# Patient Record
Sex: FEMALE | URBAN - METROPOLITAN AREA
[De-identification: names, ages, dates, MRNs, and addresses within clinical notes are randomized per-mention and may not be internally consistent; named-entity substitution may affect disease eponyms.]

---

## 2018-01-09 ENCOUNTER — INPATIENT (INPATIENT)
Facility: HOSPITAL | Age: 78
LOS: 1 days | Discharge: HOME | DRG: 103 | End: 2018-01-11
Attending: PSYCHIATRY & NEUROLOGY | Admitting: PSYCHIATRY & NEUROLOGY
Payer: MEDICARE

## 2018-01-09 VITALS
SYSTOLIC BLOOD PRESSURE: 154 MMHG | OXYGEN SATURATION: 100 % | DIASTOLIC BLOOD PRESSURE: 64 MMHG | HEART RATE: 78 BPM | RESPIRATION RATE: 28 BRPM

## 2018-01-09 LAB
ALBUMIN SERPL ELPH-MCNC: 4.4 G/DL — SIGNIFICANT CHANGE UP (ref 3.3–5)
ALP SERPL-CCNC: 67 U/L — SIGNIFICANT CHANGE UP (ref 40–120)
ALT FLD-CCNC: 19 U/L — SIGNIFICANT CHANGE UP (ref 10–45)
ANION GAP SERPL CALC-SCNC: 17 MMOL/L — SIGNIFICANT CHANGE UP (ref 5–17)
APPEARANCE UR: CLEAR — SIGNIFICANT CHANGE UP
APTT BLD: 26.7 SEC — LOW (ref 27.5–37.4)
AST SERPL-CCNC: 38 U/L — SIGNIFICANT CHANGE UP (ref 10–40)
BASOPHILS NFR BLD AUTO: 0.5 % — SIGNIFICANT CHANGE UP (ref 0–2)
BILIRUB SERPL-MCNC: 0.5 MG/DL — SIGNIFICANT CHANGE UP (ref 0.2–1.2)
BILIRUB UR-MCNC: NEGATIVE — SIGNIFICANT CHANGE UP
BUN SERPL-MCNC: 14 MG/DL — SIGNIFICANT CHANGE UP (ref 7–23)
CALCIUM SERPL-MCNC: 9.7 MG/DL — SIGNIFICANT CHANGE UP (ref 8.4–10.5)
CHLORIDE SERPL-SCNC: 87 MMOL/L — LOW (ref 96–108)
CHOLEST SERPL-MCNC: 178 MG/DL — SIGNIFICANT CHANGE UP (ref 10–199)
CO2 SERPL-SCNC: 23 MMOL/L — SIGNIFICANT CHANGE UP (ref 22–31)
COLOR SPEC: YELLOW — SIGNIFICANT CHANGE UP
CREAT SERPL-MCNC: 0.81 MG/DL — SIGNIFICANT CHANGE UP (ref 0.5–1.3)
DIFF PNL FLD: NEGATIVE — SIGNIFICANT CHANGE UP
EOSINOPHIL NFR BLD AUTO: 0.9 % — SIGNIFICANT CHANGE UP (ref 0–6)
GLUCOSE SERPL-MCNC: 113 MG/DL — HIGH (ref 70–99)
GLUCOSE UR QL: NEGATIVE — SIGNIFICANT CHANGE UP
HBA1C BLD-MCNC: 5.8 % — HIGH (ref 4–5.6)
HCT VFR BLD CALC: 36.7 % — SIGNIFICANT CHANGE UP (ref 34.5–45)
HDLC SERPL-MCNC: 105 MG/DL — SIGNIFICANT CHANGE UP (ref 40–125)
HGB BLD-MCNC: 13.1 G/DL — SIGNIFICANT CHANGE UP (ref 11.5–15.5)
INR BLD: 1.11 — SIGNIFICANT CHANGE UP (ref 0.88–1.16)
KETONES UR-MCNC: (no result) MG/DL
LACTATE SERPL-SCNC: 1.6 MMOL/L — SIGNIFICANT CHANGE UP (ref 0.5–2)
LEUKOCYTE ESTERASE UR-ACNC: NEGATIVE — SIGNIFICANT CHANGE UP
LIPID PNL WITH DIRECT LDL SERPL: 62 MG/DL — SIGNIFICANT CHANGE UP
LYMPHOCYTES # BLD AUTO: 16.3 % — SIGNIFICANT CHANGE UP (ref 13–44)
MCHC RBC-ENTMCNC: 33.3 PG — SIGNIFICANT CHANGE UP (ref 27–34)
MCHC RBC-ENTMCNC: 35.7 G/DL — SIGNIFICANT CHANGE UP (ref 32–36)
MCV RBC AUTO: 93.4 FL — SIGNIFICANT CHANGE UP (ref 80–100)
MONOCYTES NFR BLD AUTO: 14.1 % — HIGH (ref 2–14)
NEUTROPHILS NFR BLD AUTO: 68.2 % — SIGNIFICANT CHANGE UP (ref 43–77)
NITRITE UR-MCNC: NEGATIVE — SIGNIFICANT CHANGE UP
PH UR: 8.5 — HIGH (ref 5–8)
PLATELET # BLD AUTO: 276 K/UL — SIGNIFICANT CHANGE UP (ref 150–400)
POTASSIUM SERPL-MCNC: 3.9 MMOL/L — SIGNIFICANT CHANGE UP (ref 3.5–5.3)
POTASSIUM SERPL-SCNC: 3.9 MMOL/L — SIGNIFICANT CHANGE UP (ref 3.5–5.3)
PROT SERPL-MCNC: 7.8 G/DL — SIGNIFICANT CHANGE UP (ref 6–8.3)
PROT UR-MCNC: NEGATIVE MG/DL — SIGNIFICANT CHANGE UP
PROTHROM AB SERPL-ACNC: 12.3 SEC — SIGNIFICANT CHANGE UP (ref 9.8–12.7)
RBC # BLD: 3.93 M/UL — SIGNIFICANT CHANGE UP (ref 3.8–5.2)
RBC # FLD: 12.9 % — SIGNIFICANT CHANGE UP (ref 10.3–16.9)
SODIUM SERPL-SCNC: 127 MMOL/L — LOW (ref 135–145)
SP GR SPEC: 1.01 — SIGNIFICANT CHANGE UP (ref 1–1.03)
TOTAL CHOLESTEROL/HDL RATIO MEASUREMENT: 1.7 RATIO — LOW (ref 3.3–7.1)
TRIGL SERPL-MCNC: 53 MG/DL — SIGNIFICANT CHANGE UP (ref 10–149)
UROBILINOGEN FLD QL: 0.2 E.U./DL — SIGNIFICANT CHANGE UP
WBC # BLD: 5.5 K/UL — SIGNIFICANT CHANGE UP (ref 3.8–10.5)
WBC # FLD AUTO: 5.5 K/UL — SIGNIFICANT CHANGE UP (ref 3.8–10.5)

## 2018-01-09 PROCEDURE — 99223 1ST HOSP IP/OBS HIGH 75: CPT

## 2018-01-09 PROCEDURE — 99291 CRITICAL CARE FIRST HOUR: CPT | Mod: 25

## 2018-01-09 PROCEDURE — 70498 CT ANGIOGRAPHY NECK: CPT | Mod: 26

## 2018-01-09 PROCEDURE — 70496 CT ANGIOGRAPHY HEAD: CPT | Mod: 26

## 2018-01-09 PROCEDURE — 70450 CT HEAD/BRAIN W/O DYE: CPT | Mod: 26,59

## 2018-01-09 PROCEDURE — 93010 ELECTROCARDIOGRAM REPORT: CPT

## 2018-01-09 RX ORDER — SODIUM CHLORIDE 9 MG/ML
500 INJECTION INTRAMUSCULAR; INTRAVENOUS; SUBCUTANEOUS ONCE
Qty: 0 | Refills: 0 | Status: COMPLETED | OUTPATIENT
Start: 2018-01-09 | End: 2018-01-09

## 2018-01-09 RX ORDER — HEPARIN SODIUM 5000 [USP'U]/ML
5000 INJECTION INTRAVENOUS; SUBCUTANEOUS EVERY 8 HOURS
Qty: 0 | Refills: 0 | Status: DISCONTINUED | OUTPATIENT
Start: 2018-01-09 | End: 2018-01-11

## 2018-01-09 RX ORDER — ASPIRIN/CALCIUM CARB/MAGNESIUM 324 MG
325 TABLET ORAL ONCE
Qty: 0 | Refills: 0 | Status: DISCONTINUED | OUTPATIENT
Start: 2018-01-09 | End: 2018-01-09

## 2018-01-09 RX ORDER — ASPIRIN/CALCIUM CARB/MAGNESIUM 324 MG
81 TABLET ORAL DAILY
Qty: 0 | Refills: 0 | Status: DISCONTINUED | OUTPATIENT
Start: 2018-01-10 | End: 2018-01-11

## 2018-01-09 RX ORDER — CLOPIDOGREL BISULFATE 75 MG/1
300 TABLET, FILM COATED ORAL ONCE
Qty: 0 | Refills: 0 | Status: DISCONTINUED | OUTPATIENT
Start: 2018-01-09 | End: 2018-01-09

## 2018-01-09 RX ORDER — ONDANSETRON 8 MG/1
4 TABLET, FILM COATED ORAL ONCE
Qty: 0 | Refills: 0 | Status: COMPLETED | OUTPATIENT
Start: 2018-01-09 | End: 2018-01-09

## 2018-01-09 RX ORDER — ATORVASTATIN CALCIUM 80 MG/1
80 TABLET, FILM COATED ORAL ONCE
Qty: 0 | Refills: 0 | Status: COMPLETED | OUTPATIENT
Start: 2018-01-09 | End: 2018-01-09

## 2018-01-09 RX ORDER — SODIUM CHLORIDE 9 MG/ML
1000 INJECTION INTRAMUSCULAR; INTRAVENOUS; SUBCUTANEOUS
Qty: 0 | Refills: 0 | Status: DISCONTINUED | OUTPATIENT
Start: 2018-01-09 | End: 2018-01-11

## 2018-01-09 RX ORDER — CLOPIDOGREL BISULFATE 75 MG/1
150 TABLET, FILM COATED ORAL ONCE
Qty: 0 | Refills: 0 | Status: COMPLETED | OUTPATIENT
Start: 2018-01-09 | End: 2018-01-09

## 2018-01-09 RX ORDER — ATORVASTATIN CALCIUM 80 MG/1
80 TABLET, FILM COATED ORAL AT BEDTIME
Qty: 0 | Refills: 0 | Status: DISCONTINUED | OUTPATIENT
Start: 2018-01-09 | End: 2018-01-11

## 2018-01-09 RX ORDER — CLOPIDOGREL BISULFATE 75 MG/1
75 TABLET, FILM COATED ORAL DAILY
Qty: 0 | Refills: 0 | Status: DISCONTINUED | OUTPATIENT
Start: 2018-01-10 | End: 2018-01-11

## 2018-01-09 RX ORDER — ATORVASTATIN CALCIUM 80 MG/1
80 TABLET, FILM COATED ORAL AT BEDTIME
Qty: 0 | Refills: 0 | Status: DISCONTINUED | OUTPATIENT
Start: 2018-01-09 | End: 2018-01-09

## 2018-01-09 RX ORDER — ASPIRIN/CALCIUM CARB/MAGNESIUM 324 MG
325 TABLET ORAL ONCE
Qty: 0 | Refills: 0 | Status: COMPLETED | OUTPATIENT
Start: 2018-01-09 | End: 2018-01-09

## 2018-01-09 RX ADMIN — HEPARIN SODIUM 5000 UNIT(S): 5000 INJECTION INTRAVENOUS; SUBCUTANEOUS at 22:58

## 2018-01-09 RX ADMIN — SODIUM CHLORIDE 80 MILLILITER(S): 9 INJECTION INTRAMUSCULAR; INTRAVENOUS; SUBCUTANEOUS at 23:27

## 2018-01-09 RX ADMIN — SODIUM CHLORIDE 500 MILLILITER(S): 9 INJECTION INTRAMUSCULAR; INTRAVENOUS; SUBCUTANEOUS at 20:49

## 2018-01-09 RX ADMIN — Medication 325 MILLIGRAM(S): at 20:49

## 2018-01-09 RX ADMIN — ATORVASTATIN CALCIUM 80 MILLIGRAM(S): 80 TABLET, FILM COATED ORAL at 20:49

## 2018-01-09 RX ADMIN — SODIUM CHLORIDE 500 MILLILITER(S): 9 INJECTION INTRAMUSCULAR; INTRAVENOUS; SUBCUTANEOUS at 19:35

## 2018-01-09 RX ADMIN — CLOPIDOGREL BISULFATE 150 MILLIGRAM(S): 75 TABLET, FILM COATED ORAL at 20:49

## 2018-01-09 RX ADMIN — ONDANSETRON 4 MILLIGRAM(S): 8 TABLET, FILM COATED ORAL at 19:40

## 2018-01-09 NOTE — ED ADULT NURSE NOTE - CHPI ED SYMPTOMS NEG
no weakness/no vomiting/no confusion/no blurred vision/no dizziness/no fever/no loss of consciousness/no change in level of consciousness

## 2018-01-09 NOTE — H&P ADULT - PROBLEM SELECTOR PLAN 3
Patient with Hyponatremia to 127 on admission, serum Osm pending. Likely hypovolemic in setting of recent diarrhea, decreased PO intake and nausea.   - f/u Serum Osm, Urine Osm, Urine electrolytes  - Na of 127 reflects sodium deficit of 338mEq of Na  - c/w NS at 80cc/hr given hypovolemia  - f/u Q6H BMP to ensure not overcorrecting

## 2018-01-09 NOTE — ED PROVIDER NOTE - PHYSICAL EXAMINATION
CON: ao x 3, HENMT: clear oropharynx, soft neck, HEAD: atraumatic, CV: rrr, equal pulses b/l, RESP: cta b/l, GI: +BS, soft, nontender, no rebound, no guarding, SKIN: no rash, MSK: no deformities, NEURO: LUE and LLE strength 4/5, RUE and RLE 5/5, mild L facial droop, able to name all objects, ao x 3, dec sensation to LLE, b/l dysmetria

## 2018-01-09 NOTE — CONSULT NOTE ADULT - ASSESSMENT
78 y/o F PMHx migraine headaches for many years, arrived with complaints of migraine headache, left sided numbness and weakness. Also with nausea, dizziness, gait instability. NIHSS 6. Outside of the window for TPA. CT head without signs of acute infarct or hemorrhage. CTA head/neck unremarkable.    - Admit to [facility] for stroke workup  - Aspirin 81mg daily  - Atorvastatin 80mg daily  - MRI head  - Echocardiogram with bubble and doppler  - PT/OT  - Permissive HTN to 200/110  - Bedside dysphagia  - Speech consult  - HgbA1C, lipid panel, TSH 76 y/o F PMHx migraine headaches for many years, arrived with complaints of migraine headache, left sided numbness and weakness. Also with nausea, dizziness, gait instability. NIHSS 6. Outside of the window for TPA. CT head without signs of acute infarct or hemorrhage. CTA head/neck unremarkable. Patient with acute stroke with left hemisensory and hemiparesis and slurred speech that is out of the window for tpa and therefore will be loaded with 150 of plavix and 325 of asa.   No evidence of sepsis    - Admit to [facility] for stroke workup  - Aspirin 81mg daily  - Atorvastatin 80mg daily  - MRI head  - Echocardiogram with bubble and doppler  - PT/OT  - Permissive HTN to 200/110  - Bedside dysphagia  - Speech consult  - HgbA1C, lipid panel, TSH

## 2018-01-09 NOTE — ED PROVIDER NOTE - PROGRESS NOTE DETAILS
poor historian, initially per EMS onset of sx 2 hr ago, stroke called for launch pad, upon further interview w/ stroke team, appears paresthesia began at noon, d/w stroke team, no indication for tPA, also check signs of infection.  pt rectally afebrile, no leukocytosis, more likely neuro.  TIA/CVA vs complex migraine?  will admit to neuro for further eval

## 2018-01-09 NOTE — H&P ADULT - PROBLEM SELECTOR PLAN 4
Patient states she has a history of asthma, thinks she is on Montelukast and another inhaler, unable to remember names of medications  - Duoneb PRN  - f/u collateral in AM

## 2018-01-09 NOTE — H&P ADULT - ATTENDING COMMENTS
Patient seen and examined with son and daughter at bedside.  Patient came to Emergency Room when she thought that she saw left facial droop when she looked into the mirror.  No specific weakness on left arm or leg.  She had some numbness of her left hand to shoulder but she sometimes has that during migraines.  She had numbness around the right side of her lips and her mouth felt asleep.  This feeling didn't last long.  Some slurred speech.  No word finding difficulty.  Some trouble seeing the periphery of her vision of both eyes which is new.  She's also had double vision for the past few days with images [id number] but slightly askew.  She also felt shaky but that happens when she feels anxious.    She's most concerned about her headache that was located at her cheek bones and the right side of her head and the back of her neck.  It was very severe, rating 9/10.  She was treating it by drinking extra water.  She doesn't like to take medications.  She's tried Verapamil and something else prescribed by her Neurologist but it didn't help the headaches.  She rates her headache 3/10 now.    Her neurological exam is generally negative.  Her lip slants to the left but she has this on previous pictures on her Instagram account so doubt new.  Her symptoms sound most compatible with complicated migraine.  Will await MRI Brain to rule out infarct since it's a diagnosis of exclusion.

## 2018-01-09 NOTE — H&P ADULT - NSHPPHYSICALEXAM_GEN_ALL_CORE
.  VITAL SIGNS:  T(C): 36.8 (01-09-18 @ 23:55), Max: 37 (01-09-18 @ 19:41)  T(F): 98.3 (01-09-18 @ 23:55), Max: 98.6 (01-09-18 @ 19:41)  HR: 67 (01-09-18 @ 23:55) (58 - 78)  BP: 144/65 (01-09-18 @ 23:55) (144/65 - 156/82)  BP(mean): --  RR: 18 (01-09-18 @ 23:55) (18 - 28)  SpO2: 100% (01-09-18 @ 23:55) (98% - 100%)  Wt(kg): --    PHYSICAL EXAM:    Constitutional: Elderly female resting in bed, mild distress appears 2/2 anxiety  Head: NC/AT  Eyes: Ptosis that corrects with effort, PERRL, EOMI, anicteric sclera  ENT: no nasal discharge; uvula midline, no oropharyngeal erythema or exudates; MMM  Neck: supple; no JVD or thyromegaly  Respiratory: CTA B/L; no W/R/R, no retractions  Cardiac: +S1/S2; RRR; no M/R/G; PMI non-displaced  Gastrointestinal: soft, NT/ND; no rebound or guarding; +BSx4  Back: spine midline, no bony tenderness or step-offs; no CVAT B/L  Extremities: WWP, no clubbing or cyanosis; no peripheral edema  Musculoskeletal: NROM x4; no joint swelling, tenderness or erythema  Vascular: 2+ radial, femoral, DP/PT pulses B/L  Dermatologic: skin warm, dry and intact; no rashes, wounds, or scars  Lymphatic: no submandibular or cervical LAD  Neurologic: AAOx3; mild L sided facial droop, tongue midline, shrug intact, 4/5 LUE and LLE, 5/5 RUE and RLE, decreased sensation to light touch and pinprick of left arm, dysmetria b/l on finger-nose testing  Psychiatric: Patient appears extremely anxious with shaking episodes and fears of nausea

## 2018-01-09 NOTE — H&P ADULT - PROBLEM SELECTOR PLAN 2
Patient with long history of severe migraine headaches with aura, usually visual scotoma with "burning" feeling of the brain. Patient with these prior to episode, though states all symptoms from aura to headache and associated L sided weakness/numbness are worse than usual  - Continue to monitor symptoms  - Tylenol PRN  - Patient unsure of home medications, f/u collateral in AM

## 2018-01-09 NOTE — ED ADULT NURSE REASSESSMENT NOTE - NS ED NURSE REASSESS COMMENT FT1
unable to collect blood cultures. pt afebrile. dr fisher aware. as per dr fisher ok to hold off on blood cultures at this time.

## 2018-01-09 NOTE — H&P ADULT - HISTORY OF PRESENT ILLNESS
Patient is a 78yo F poor historian with a PMHx of migraine headaches for many years, asthma, who arrived with complaints of migraine headache, left sided numbness and weakness. Patient states she usually gets severe migraine headaches, but not quite this severe and with less pronounced symptoms of weakness or numbness. She said the headache started at work () at 12pm and right after she noticed left arm numbness and weakness. She began to feel nauseated and dizzy. She went home to lay down. She had 6 episodes of diarrhea, initially soft then progressed to watery. Daughter went to patient's house to see her and she noticed the patient's speech was slower and slurred. She also noticed that the patient was unsteady on her feet while walking to the bathroom. Patient's  is a physician and advised them to come to the hospital. Patient arrived to the ED as a stroke code at 4:48pm. Per family, patient's blood pressures are usually low. Patient denies any other recent changes to her health, denies weight gain or weight loss, fevers, chills, cough, SOB, CP, palpitations, abdominal pain, dysuria, lower extremity edema, decreased exercise tolerance.    In the ED, VS T 98.6, HR 78, /64, R 20, SpO2 98% on room air. Labs without leukocytosis or anemia, no coagulopathy, hyponatremia to 127, hypochloremia to 87, Patient is a 76yo F poor historian with a PMHx of migraine headaches for many years, asthma, who arrived with complaints of migraine headache, left sided numbness and weakness. Patient states she usually gets severe migraine headaches, but not quite this severe and with less pronounced symptoms of weakness or numbness. She said the headache started at work () at 12pm and right after she noticed left arm numbness and weakness. She began to feel nauseated and dizzy. She went home to lay down. She had 6 episodes of diarrhea, initially soft then progressed to watery. Daughter went to patient's house to see her and she noticed the patient's speech was slower and slurred. She also noticed that the patient was unsteady on her feet while walking to the bathroom. Patient's  is a physician and advised them to come to the hospital. Patient arrived to the ED as a stroke code at 4:48pm. Per family, patient's blood pressures are usually low. Patient denies any other recent changes to her health, denies weight gain or weight loss, fevers, chills, cough, SOB, CP, palpitations, abdominal pain, dysuria, lower extremity edema, decreased exercise tolerance.    In the ED, VS T 98.6, HR 78, /64, R 20, SpO2 98% on room air. Labs without leukocytosis or anemia, no coagulopathy, hyponatremia to 127, hypochloremia to 87, negative UA, CT without evidence of acute hemorrhage or infarct, CTA without significant stenosis. Patient given Aspirin 325mg, Atorvastatin 80mg, Plavix 150mg, Zofran 4mg, and a 1L NS bolus. Patient admitted to [facility] under Neurology for telemetry monitoring. Patient is a 76yo F poor historian with a PMHx of migraine headaches for many years, asthma, who arrived with complaints of migraine headache, left sided numbness and weakness. Patient states she usually gets severe migraine headaches, but not quite this severe and with less pronounced symptoms of weakness or numbness. She said the headache started at work () at 12pm and right after she noticed left arm numbness and weakness. She began to feel nauseated and dizzy. She went home to lay down. She had 6 episodes of diarrhea, initially soft then progressed to watery. Daughter went to patient's house to see her and she noticed the patient's speech was slower and slurred. She also noticed that the patient was unsteady on her feet while walking to the bathroom. Patient's  is a physician and advised them to come to the hospital. Patient arrived to the ED as a stroke code at 6:48pm. Per family, patient's blood pressures are usually low. Patient denies any other recent changes to her health, denies weight gain or weight loss, fevers, chills, cough, SOB, CP, palpitations, abdominal pain, dysuria, lower extremity edema, decreased exercise tolerance.    In the ED, VS T 98.6, HR 78, /64, R 20, SpO2 98% on room air. Labs without leukocytosis or anemia, no coagulopathy, hyponatremia to 127, hypochloremia to 87, negative UA, CT without evidence of acute hemorrhage or infarct, CTA without significant stenosis. Patient given Aspirin 325mg, Atorvastatin 80mg, Plavix 150mg, Zofran 4mg, and a 1L NS bolus. Patient admitted to [facility] under Neurology for telemetry monitoring.

## 2018-01-09 NOTE — H&P ADULT - PROBLEM SELECTOR PLAN 1
Patient presenting after roughly 7 hours of L sided numbness and weakness, presenting as a stroke code at 6:48pm with CT negative for acute infarct or hemorrhage and CTA without any significant stenosis. Patient outside the window for tPA administration. NIHSS of 6 on admission.  - Discuss MRI with Dr. Gee in AM  - c/w Aspirin 81mg, Plavix 75mg, Atorvastatin 80mg  - PT/OT consult  - Bedside dysphagia screening  - f/u A1c, Lipid panel, TSH  - EKG, Echo  - telemetry monitoring on [facility]  - Permissive HTN to 220/110 Patient presenting after roughly 7 hours of L sided numbness and weakness, presenting as a stroke code at 6:48pm with CT negative for acute infarct or hemorrhage and CTA without any significant stenosis. Patient outside the window for tPA administration. NIHSS of 6 on admission.  - needs MRI brain  - c/w Aspirin 81mg, Plavix 75mg, Atorvastatin 80mg  - PT/OT consult  - Bedside dysphagia screening  - f/u A1c, Lipid panel, TSH  - EKG, Echo  - telemetry monitoring on [facility]  - Permissive HTN to 220/110

## 2018-01-09 NOTE — H&P ADULT - ASSESSMENT
76yo F poor historian with a PMHx of migraine headaches for many years, asthma, who arrived with complaints of migraine headache, left sided numbness and weakness at noon on the day of admission, presenting as a stroke code at 6:48pm outside of the window for tPA, symptoms concerning for infarct vs TIA vs complex migraine

## 2018-01-09 NOTE — ED PROVIDER NOTE - OBJECTIVE STATEMENT
77 yof pw left arm numbness, unsteadiness, and migraine.  pt states sx began at noon (pt poor historian, initially reported sx onset 2hr PTA in ED, but then also reported noon).  also had diarrhea afterwards.  daughter reports slur of speech/slow to speech as well.  no HA, no cp.  code stroke called.

## 2018-01-09 NOTE — CONSULT NOTE ADULT - SUBJECTIVE AND OBJECTIVE BOX
76 y/o F PMHx migraine headaches for many years, arrived with complaints of migraine headache, left sided numbness and weakness. Patient states she usually gets severe migraine headaches, but not usually associated with these symptoms. She said the headache started at work () at 12pm and right after she noticed left arm numbness and weakness. She began to feel nauseated and dizzy. She went home to lay down. She had 6 episodes of diarrhea, initially soft then progressed to watery. Daughter NEUROLOGY INITIAL CONSULT NOTE    CHIEF COMPLAINT:  slurred speech    HPI: 76 y/o F PMHx migraine headaches for many years, arrived with complaints of migraine headache, left sided numbness and weakness. Patient states she usually gets severe migraine headaches, but not usually associated with these symptoms. She said the headache started at work () at 12pm and right after she noticed left arm numbness and weakness. She began to feel nauseated and dizzy. She went home to lay down. She had 6 episodes of diarrhea, initially soft then progressed to watery. Daughter went to patient's house to see her and she noticed the patient's speech was slower and slurred. She also noticed that the patient was unsteady on her feet while walking to the bathroom. Patient's  is a physician and advised them to come to the hospital. Patient arrived to the ED as a stroke code at 4:48pm. Per family, patient's blood pressures are usually low.    On arrival /64      PAST MEDICAL & SURGICAL HISTORY:  Migraines      REVIEW OF SYSTEMS:  As per HPI, otherwise negative for Constitutional, Eyes, Ears/Nose/Mouth/Throat, Neck, Cardiovascular, Respiratory, Gastrointestinal, Genitourinary, Skin, Endocrine, Musculoskeletal, Psychiatric, and Hematologic/Lymphatic.    MEDICATIONS  (STANDING):  aspirin 325 milliGRAM(s) Oral Once  atorvastatin 80 milliGRAM(s) Oral once  clopidogrel Tablet 150 milliGRAM(s) Oral once  sodium chloride 0.9% Bolus 500 milliLiter(s) IV Bolus once    MEDICATIONS  (PRN):      Allergies    epinephrine (Other)    Intolerances        FAMILY HISTORY:      SOCIAL HISTORY:  Living Situation: lives with   Occupation:   Tobacco: unknown  Alcohol: unknown  Drug use:  unknown    VITAL SIGNS:  Vital Signs Last 24 Hrs  T(C): 37 (2018 19:41), Max: 37 (2018 19:41)  T(F): 98.6 (2018 19:41), Max: 98.6 (2018 19:41)  HR: 58 (2018 19:41) (58 - 78)  BP: 153/63 (2018 19:41) (144/71 - 154/64)  BP(mean): --  RR: 20 (2018 19:41) (20 - 28)  SpO2: 98% (2018 19:41) (98% - 100%)    PHYSICAL EXAMINATION:  General: Well-developed, well nourished, anxious, nauseated.  Eyes: Conjunctiva and sclera clear.  Cardiovascular: Regular rate and rhythm; S1 and S2 Normal; No murmurs, gallops or rubs.  Neurologic:  - Mental Status:  Alert, awake, oriented to person, place, and time; Speech is mildly slurred with intact naming, repetition, and comprehension; Good overall fund of knowledge.  - Cranial Nerves II-XII:    II:  Visual acuity is 20/20 bilaterally; Visual fields are full to confrontation; Pupils are equal, round, and reactive to light.  III, IV, VI:  Extraocular movements are intact without nystagmus.  V:  Facial sensation is intact in the V1-V3 distribution bilaterally.  VII:  Face is symmetric with normal eye closure and smile; + mild left facial droop  VIII:  Hearing is intact to finger rub.  IX, X:  Uvula is midline and soft palate rises symmetrically  XI:  Head turning and shoulder shrug are intact.  XII:  Tongue protrudes in the midline.  - Motor:  Strength is 4/5 LUE and LLE, 5/5 RUE and RLE.  There is no pronator drift.  Normal muscle bulk and tone throughout.  - Reflexes:  2+ and symmetric at the biceps, triceps, brachioradialis, knees, and ankles.  Plantar responses flexor.  - Sensory:  Decreased to light touch and pinprick of left arm.  - Coordination:  Finger-nose-finger with dysmetria b/l.  Rapid alternating hand movements intact.  - Gait:   Deferred    LABS:                        13.1   5.5   )-----------( 276      ( 2018 19:47 )             36.7         127<L>  |  87<L>  |  14  ----------------------------<  113<H>  3.9   |  23  |  0.81    Ca    9.7      2018 19:47    TPro  7.8  /  Alb  4.4  /  TBili  0.5  /  DBili  x   /  AST  38  /  ALT  19  /  AlkPhos  67  -    PT/INR - ( 2018 19:47 )   PT: 12.3 sec;   INR: 1.11          PTT - ( 2018 19:47 )  PTT:26.7 sec  Urinalysis Basic - ( 2018 20:07 )    Color: Yellow / Appearance: Clear / S.010 / pH: x  Gluc: x / Ketone: Trace mg/dL  / Bili: Negative / Urobili: 0.2 E.U./dL   Blood: x / Protein: NEGATIVE mg/dL / Nitrite: NEGATIVE   Leuk Esterase: NEGATIVE / RBC: x / WBC x   Sq Epi: x / Non Sq Epi: x / Bacteria: x        RADIOLOGY & ADDITIONAL STUDIES:    < from: CT Brain Stroke Protocol (18 @ 19:27) >  IMPRESSION:    1.  No CT evidence of acute intracranial hemorrhage.  2. 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 If   there is concern for acute infarct, consideration could be given to brain   MR with diffusion-weighted imaging as this is a more sensitive study for   its detection.  3. Age-appropriate volume loss.    < end of copied text >    < from: CT Angio Head w/ IV Cont (18 @ 19:27) >  IMPRESSION:     CTA BRAIN:    No significant stenosis of the intracranial [place].    CTA NECK:     1. No significant carotid bifurcation stenosis or vertebral artery   stenosis.  2. Left vertebral artery origin arises directly from the aortic arch,   normal variant.    < end of copied text >

## 2018-01-09 NOTE — H&P ADULT - NSHPLABSRESULTS_GEN_ALL_CORE
.  LABS:                         13.1   5.5   )-----------( 276      ( 2018 19:47 )             36.7         127<L>  |  87<L>  |  14  ----------------------------<  113<H>  3.9   |  23  |  0.81    Ca    9.7      2018 19:47    TPro  7.8  /  Alb  4.4  /  TBili  0.5  /  DBili  x   /  AST  38  /  ALT  19  /  AlkPhos  67      PT/INR - ( 2018 19:47 )   PT: 12.3 sec;   INR: 1.11          PTT - ( 2018 19:47 )  PTT:26.7 sec  Urinalysis Basic - ( 2018 20:07 )    Color: Yellow / Appearance: Clear / S.010 / pH: x  Gluc: x / Ketone: Trace mg/dL  / Bili: Negative / Urobili: 0.2 E.U./dL   Blood: x / Protein: NEGATIVE mg/dL / Nitrite: NEGATIVE   Leuk Esterase: NEGATIVE / RBC: x / WBC x   Sq Epi: x / Non Sq Epi: x / Bacteria: x            Lactate, Blood: 1.6 mmoL/L ( @ 20:23)      RADIOLOGY, EKG & ADDITIONAL TESTS: Reviewed. .  LABS:                         13.1   5.5   )-----------( 276      ( 2018 19:47 )             36.7         127<L>  |  87<L>  |  14  ----------------------------<  113<H>  3.9   |  23  |  0.81    Ca    9.7      2018 19:47    TPro  7.8  /  Alb  4.4  /  TBili  0.5  /  DBili  x   /  AST  38  /  ALT  19  /  AlkPhos  67      PT/INR - ( 2018 19:47 )   PT: 12.3 sec;   INR: 1.11          PTT - ( 2018 19:47 )  PTT:26.7 sec  Urinalysis Basic - ( 2018 20:07 )    Color: Yellow / Appearance: Clear / S.010 / pH: x  Gluc: x / Ketone: Trace mg/dL  / Bili: Negative / Urobili: 0.2 E.U./dL   Blood: x / Protein: NEGATIVE mg/dL / Nitrite: NEGATIVE   Leuk Esterase: NEGATIVE / RBC: x / WBC x   Sq Epi: x / Non Sq Epi: x / Bacteria: x      Lactate, Blood: 1.6 mmoL/L ( @ 20:23)      RADIOLOGY, EKG & ADDITIONAL TESTS: Reviewed.

## 2018-01-10 DIAGNOSIS — R63.8 OTHER SYMPTOMS AND SIGNS CONCERNING FOOD AND FLUID INTAKE: ICD-10-CM

## 2018-01-10 DIAGNOSIS — Z02.9 ENCOUNTER FOR ADMINISTRATIVE EXAMINATIONS, UNSPECIFIED: ICD-10-CM

## 2018-01-10 DIAGNOSIS — R20.2 PARESTHESIA OF SKIN: ICD-10-CM

## 2018-01-10 DIAGNOSIS — J45.909 UNSPECIFIED ASTHMA, UNCOMPLICATED: ICD-10-CM

## 2018-01-10 DIAGNOSIS — Z29.9 ENCOUNTER FOR PROPHYLACTIC MEASURES, UNSPECIFIED: ICD-10-CM

## 2018-01-10 DIAGNOSIS — E87.1 HYPO-OSMOLALITY AND HYPONATREMIA: ICD-10-CM

## 2018-01-10 DIAGNOSIS — G43.909 MIGRAINE, UNSPECIFIED, NOT INTRACTABLE, WITHOUT STATUS MIGRAINOSUS: ICD-10-CM

## 2018-01-10 LAB
ANION GAP SERPL CALC-SCNC: 14 MMOL/L — SIGNIFICANT CHANGE UP (ref 5–17)
BUN SERPL-MCNC: 11 MG/DL — SIGNIFICANT CHANGE UP (ref 7–23)
CALCIUM SERPL-MCNC: 8.4 MG/DL — SIGNIFICANT CHANGE UP (ref 8.4–10.5)
CHLORIDE SERPL-SCNC: 92 MMOL/L — LOW (ref 96–108)
CHOLEST SERPL-MCNC: 152 MG/DL — SIGNIFICANT CHANGE UP (ref 10–199)
CO2 SERPL-SCNC: 20 MMOL/L — LOW (ref 22–31)
CREAT SERPL-MCNC: 0.74 MG/DL — SIGNIFICANT CHANGE UP (ref 0.5–1.3)
GLUCOSE SERPL-MCNC: 130 MG/DL — HIGH (ref 70–99)
HBA1C BLD-MCNC: 5.5 % — SIGNIFICANT CHANGE UP (ref 4–5.6)
HCOV NL63 RNA SPEC QL NAA+PROBE: DETECTED
HDLC SERPL-MCNC: 94 MG/DL — SIGNIFICANT CHANGE UP (ref 40–125)
LIPID PNL WITH DIRECT LDL SERPL: 50 MG/DL — SIGNIFICANT CHANGE UP
OSMOLALITY SERPL: 273 MOSM/KG — LOW (ref 280–301)
POTASSIUM SERPL-MCNC: 3.8 MMOL/L — SIGNIFICANT CHANGE UP (ref 3.5–5.3)
POTASSIUM SERPL-SCNC: 3.8 MMOL/L — SIGNIFICANT CHANGE UP (ref 3.5–5.3)
RAPID RVP RESULT: DETECTED
SODIUM SERPL-SCNC: 126 MMOL/L — LOW (ref 135–145)
TOTAL CHOLESTEROL/HDL RATIO MEASUREMENT: 1.6 RATIO — LOW (ref 3.3–7.1)
TRIGL SERPL-MCNC: 39 MG/DL — SIGNIFICANT CHANGE UP (ref 10–149)
TSH SERPL-MCNC: 1.02 UIU/ML — SIGNIFICANT CHANGE UP (ref 0.35–4.94)

## 2018-01-10 PROCEDURE — 71045 X-RAY EXAM CHEST 1 VIEW: CPT | Mod: 26

## 2018-01-10 PROCEDURE — 93306 TTE W/DOPPLER COMPLETE: CPT | Mod: 26

## 2018-01-10 PROCEDURE — 70551 MRI BRAIN STEM W/O DYE: CPT | Mod: 26

## 2018-01-10 PROCEDURE — 99233 SBSQ HOSP IP/OBS HIGH 50: CPT

## 2018-01-10 RX ORDER — ACETAMINOPHEN 500 MG
650 TABLET ORAL EVERY 6 HOURS
Qty: 0 | Refills: 0 | Status: DISCONTINUED | OUTPATIENT
Start: 2018-01-10 | End: 2018-01-11

## 2018-01-10 RX ADMIN — Medication 650 MILLIGRAM(S): at 21:41

## 2018-01-10 RX ADMIN — Medication 650 MILLIGRAM(S): at 16:01

## 2018-01-10 RX ADMIN — ATORVASTATIN CALCIUM 80 MILLIGRAM(S): 80 TABLET, FILM COATED ORAL at 21:41

## 2018-01-10 RX ADMIN — Medication 81 MILLIGRAM(S): at 13:46

## 2018-01-10 RX ADMIN — HEPARIN SODIUM 5000 UNIT(S): 5000 INJECTION INTRAVENOUS; SUBCUTANEOUS at 21:43

## 2018-01-10 RX ADMIN — HEPARIN SODIUM 5000 UNIT(S): 5000 INJECTION INTRAVENOUS; SUBCUTANEOUS at 14:06

## 2018-01-10 RX ADMIN — SODIUM CHLORIDE 80 MILLILITER(S): 9 INJECTION INTRAMUSCULAR; INTRAVENOUS; SUBCUTANEOUS at 16:02

## 2018-01-10 RX ADMIN — CLOPIDOGREL BISULFATE 75 MILLIGRAM(S): 75 TABLET, FILM COATED ORAL at 13:45

## 2018-01-10 RX ADMIN — HEPARIN SODIUM 5000 UNIT(S): 5000 INJECTION INTRAVENOUS; SUBCUTANEOUS at 07:25

## 2018-01-10 NOTE — PHYSICAL THERAPY INITIAL EVALUATION ADULT - GENERAL OBSERVATIONS, REHAB EVAL
Patient received semi-supine in bed in no acute distress, heplock. Patient with complaints of headache, given Tylenol by RN.

## 2018-01-10 NOTE — PHYSICAL THERAPY INITIAL EVALUATION ADULT - PERTINENT HX OF CURRENT PROBLEM, REHAB EVAL
77 year old female arrived with complaints of migraine headache, left sided numbness and weakness. Patient with history of migraines but not as severe and with pronounced symptoms, patient now with slurred speech, unsteady gait and diarrhea.

## 2018-01-10 NOTE — PHYSICAL THERAPY INITIAL EVALUATION ADULT - ADDITIONAL COMMENTS
Patient lives in elevator apartment building with 5 steps in lobby, independent without devices. Patient is right handed, does not wear glasses. Patient denies numbness and tingling. As per patient states her speech is fine, as per family present states her speech sounds like it does when she is not feeling well, but it is not slurred currently.     CN II-XII intact, vision intact, smooth pursuit.

## 2018-01-10 NOTE — PHYSICAL THERAPY INITIAL EVALUATION ADULT - CRITERIA FOR SKILLED THERAPEUTIC INTERVENTIONS
impairments found/rehab potential/therapy frequency/anticipated discharge recommendation/functional limitations in following categories

## 2018-01-11 ENCOUNTER — TRANSCRIPTION ENCOUNTER (OUTPATIENT)
Age: 78
End: 2018-01-11

## 2018-01-11 VITALS
HEART RATE: 52 BPM | OXYGEN SATURATION: 98 % | DIASTOLIC BLOOD PRESSURE: 69 MMHG | SYSTOLIC BLOOD PRESSURE: 153 MMHG | RESPIRATION RATE: 17 BRPM

## 2018-01-11 DIAGNOSIS — R68.89 OTHER GENERAL SYMPTOMS AND SIGNS: ICD-10-CM

## 2018-01-11 LAB
ANION GAP SERPL CALC-SCNC: 12 MMOL/L — SIGNIFICANT CHANGE UP (ref 5–17)
BUN SERPL-MCNC: 11 MG/DL — SIGNIFICANT CHANGE UP (ref 7–23)
CALCIUM SERPL-MCNC: 8.7 MG/DL — SIGNIFICANT CHANGE UP (ref 8.4–10.5)
CHLORIDE SERPL-SCNC: 96 MMOL/L — SIGNIFICANT CHANGE UP (ref 96–108)
CO2 SERPL-SCNC: 22 MMOL/L — SIGNIFICANT CHANGE UP (ref 22–31)
CREAT SERPL-MCNC: 0.81 MG/DL — SIGNIFICANT CHANGE UP (ref 0.5–1.3)
GLUCOSE SERPL-MCNC: 96 MG/DL — SIGNIFICANT CHANGE UP (ref 70–99)
HCT VFR BLD CALC: 33.5 % — LOW (ref 34.5–45)
HGB BLD-MCNC: 11.7 G/DL — SIGNIFICANT CHANGE UP (ref 11.5–15.5)
MAGNESIUM SERPL-MCNC: 2 MG/DL — SIGNIFICANT CHANGE UP (ref 1.6–2.6)
MCHC RBC-ENTMCNC: 33.1 PG — SIGNIFICANT CHANGE UP (ref 27–34)
MCHC RBC-ENTMCNC: 34.9 G/DL — SIGNIFICANT CHANGE UP (ref 32–36)
MCV RBC AUTO: 94.9 FL — SIGNIFICANT CHANGE UP (ref 80–100)
PHOSPHATE SERPL-MCNC: 3.1 MG/DL — SIGNIFICANT CHANGE UP (ref 2.5–4.5)
PLATELET # BLD AUTO: 238 K/UL — SIGNIFICANT CHANGE UP (ref 150–400)
POTASSIUM SERPL-MCNC: 3.6 MMOL/L — SIGNIFICANT CHANGE UP (ref 3.5–5.3)
POTASSIUM SERPL-SCNC: 3.6 MMOL/L — SIGNIFICANT CHANGE UP (ref 3.5–5.3)
RBC # BLD: 3.53 M/UL — LOW (ref 3.8–5.2)
RBC # FLD: 12.8 % — SIGNIFICANT CHANGE UP (ref 10.3–16.9)
SODIUM SERPL-SCNC: 130 MMOL/L — LOW (ref 135–145)
WBC # BLD: 4.4 K/UL — SIGNIFICANT CHANGE UP (ref 3.8–10.5)
WBC # FLD AUTO: 4.4 K/UL — SIGNIFICANT CHANGE UP (ref 3.8–10.5)

## 2018-01-11 PROCEDURE — 84300 ASSAY OF URINE SODIUM: CPT

## 2018-01-11 PROCEDURE — 85027 COMPLETE CBC AUTOMATED: CPT

## 2018-01-11 PROCEDURE — 85730 THROMBOPLASTIN TIME PARTIAL: CPT

## 2018-01-11 PROCEDURE — 84100 ASSAY OF PHOSPHORUS: CPT

## 2018-01-11 PROCEDURE — 87581 M.PNEUMON DNA AMP PROBE: CPT

## 2018-01-11 PROCEDURE — 83930 ASSAY OF BLOOD OSMOLALITY: CPT

## 2018-01-11 PROCEDURE — 93306 TTE W/DOPPLER COMPLETE: CPT

## 2018-01-11 PROCEDURE — 83036 HEMOGLOBIN GLYCOSYLATED A1C: CPT

## 2018-01-11 PROCEDURE — 70496 CT ANGIOGRAPHY HEAD: CPT

## 2018-01-11 PROCEDURE — 70551 MRI BRAIN STEM W/O DYE: CPT

## 2018-01-11 PROCEDURE — 87040 BLOOD CULTURE FOR BACTERIA: CPT

## 2018-01-11 PROCEDURE — 87633 RESP VIRUS 12-25 TARGETS: CPT

## 2018-01-11 PROCEDURE — 85610 PROTHROMBIN TIME: CPT

## 2018-01-11 PROCEDURE — 84443 ASSAY THYROID STIM HORMONE: CPT

## 2018-01-11 PROCEDURE — 97161 PT EVAL LOW COMPLEX 20 MIN: CPT

## 2018-01-11 PROCEDURE — 83605 ASSAY OF LACTIC ACID: CPT

## 2018-01-11 PROCEDURE — 93005 ELECTROCARDIOGRAM TRACING: CPT

## 2018-01-11 PROCEDURE — 71045 X-RAY EXAM CHEST 1 VIEW: CPT

## 2018-01-11 PROCEDURE — 82962 GLUCOSE BLOOD TEST: CPT

## 2018-01-11 PROCEDURE — 80061 LIPID PANEL: CPT

## 2018-01-11 PROCEDURE — 83735 ASSAY OF MAGNESIUM: CPT

## 2018-01-11 PROCEDURE — 87798 DETECT AGENT NOS DNA AMP: CPT

## 2018-01-11 PROCEDURE — 84540 ASSAY OF URINE/UREA-N: CPT

## 2018-01-11 PROCEDURE — 83935 ASSAY OF URINE OSMOLALITY: CPT

## 2018-01-11 PROCEDURE — 82570 ASSAY OF URINE CREATININE: CPT

## 2018-01-11 PROCEDURE — 70450 CT HEAD/BRAIN W/O DYE: CPT

## 2018-01-11 PROCEDURE — 99291 CRITICAL CARE FIRST HOUR: CPT | Mod: 25

## 2018-01-11 PROCEDURE — 80307 DRUG TEST PRSMV CHEM ANLYZR: CPT

## 2018-01-11 PROCEDURE — 81003 URINALYSIS AUTO W/O SCOPE: CPT

## 2018-01-11 PROCEDURE — 87486 CHLMYD PNEUM DNA AMP PROBE: CPT

## 2018-01-11 PROCEDURE — 70498 CT ANGIOGRAPHY NECK: CPT

## 2018-01-11 PROCEDURE — 80048 BASIC METABOLIC PNL TOTAL CA: CPT

## 2018-01-11 PROCEDURE — 36415 COLL VENOUS BLD VENIPUNCTURE: CPT

## 2018-01-11 PROCEDURE — 99238 HOSP IP/OBS DSCHRG MGMT 30/<: CPT

## 2018-01-11 PROCEDURE — 80053 COMPREHEN METABOLIC PANEL: CPT

## 2018-01-11 PROCEDURE — 85025 COMPLETE CBC W/AUTO DIFF WBC: CPT

## 2018-01-11 PROCEDURE — 96374 THER/PROPH/DIAG INJ IV PUSH: CPT | Mod: XU

## 2018-01-11 RX ORDER — ASPIRIN/CALCIUM CARB/MAGNESIUM 324 MG
1 TABLET ORAL
Qty: 30 | Refills: 0 | OUTPATIENT
Start: 2018-01-11 | End: 2018-02-09

## 2018-01-11 RX ORDER — CLOPIDOGREL BISULFATE 75 MG/1
1 TABLET, FILM COATED ORAL
Qty: 30 | Refills: 0 | OUTPATIENT
Start: 2018-01-11 | End: 2018-02-09

## 2018-01-11 RX ORDER — ATORVASTATIN CALCIUM 80 MG/1
1 TABLET, FILM COATED ORAL
Qty: 30 | Refills: 0 | OUTPATIENT
Start: 2018-01-11 | End: 2018-02-09

## 2018-01-11 RX ADMIN — Medication 650 MILLIGRAM(S): at 01:18

## 2018-01-11 RX ADMIN — Medication 650 MILLIGRAM(S): at 08:42

## 2018-01-11 NOTE — PROGRESS NOTE ADULT - PROBLEM SELECTOR PLAN 4
Patient states she has a history of asthma, thinks she is on Montelukast and another inhaler, unable to remember names of medications  - Duoneb PRN  - f/u collateral in AM
Patient with Hyponatremia to 127 on admission, remains at 127, asymptomatic. 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   - c/w NS at 80cc/hr given hypovolemia

## 2018-01-11 NOTE — DISCHARGE NOTE ADULT - PLAN OF CARE
Please follow up with Neurology at [facility] or Dr. Coyne (Neurology at [facility]) Your symptoms, which self-resolved and are associated with migraines are most likely a complex migraine syndrome. You did not have evidence of stroke on repeated examinations (MRI, CT scan, ECHO) You came in with low sodium which is improving with fluid intake. Please follow up with your Primary Care Doctor regarding this (sodium of 127-->130) You have a cold virus which may explain some of your symptoms.

## 2018-01-11 NOTE — DISCHARGE NOTE ADULT - CONDITIONS AT DISCHARGE
Pt has been well over night except for stress due to alarms and ER population. Pt is showing normal neuro functioning and is ready to go home. Pt will go home with her family. IVs have been removed.

## 2018-01-11 NOTE — PROGRESS NOTE ADULT - SUBJECTIVE AND OBJECTIVE BOX
NEUROLOGY PROGRESS NOTE    INTERVAL HISTORY:  none     REVIEW OF SYSTEMS:  As per HPI, otherwise negative for Constitutional, Eyes, Ears/Nose/Mouth/Throat, Neck, Cardiovascular, Respiratory, Gastrointestinal, Genitourinary, Skin, Endocrine, Musculoskeletal, Psychiatric, and Hematologic/Lymphatic.    MEDICATIONS:  acetaminophen   Tablet. 650 milliGRAM(s) Oral every 6 hours PRN  aspirin  chewable 81 milliGRAM(s) Oral daily  atorvastatin 80 milliGRAM(s) Oral at bedtime  clopidogrel Tablet 75 milliGRAM(s) Oral daily  heparin  Injectable 5000 Unit(s) SubCutaneous every 8 hours  sodium chloride 0.9%. 1000 milliLiter(s) IV Continuous <Continuous>    VITAL SIGNS:  Vital Signs Last 24 Hrs  T(C): 36.6 (10 Shorty 2018 15:49), Max: 37.2 (10 Shorty 2018 09:27)  T(F): 97.9 (10 Shorty 2018 15:49), Max: 98.9 (10 Shorty 2018 09:27)  HR: 57 (10 Shorty 2018 16:23) (53 - 78)  BP: 130/66 (10 Shorty 2018 16:23) (121/54 - 156/82)  BP(mean): --  RR: 18 (10 Shorty 2018 15:49) (18 - 28)  SpO2: 98% (10 Shorty 2018 15:49) (97% - 100%)    PHYSICAL EXAMINATION:  Constitutional: WDWN; NAD  Eyes: anicteric sclera  Cardiovascular: +S1/S2, RRR; no M/R/G  Neurologic:  - Mental Status:  AAOx3; speech is fluent with intact naming, repetition, and comprehension  - Cranial Nerves II-XII:    II:  PERRLA; visual fields are full to confrontation  III, IV, VI:  EOMI, no nystagmus  V:  facial sensation is intact in the V1-V3 distribution bilaterally.  VII:  mild L sided facial droop   VIII:  hearing is intact to finger rub  IX, X:  uvula is midline and soft palate rises symmetrically  XI:  head turning and shoulder shrug are intact bilaterally  XII:  tongue protrudes in the midline  - Motor:  strength is 5/5 throughout; normal muscle bulk and tone throughout; no pronator drift  - Sensory:  intact to light touch, pin prick, vibration, and joint-position sense throughout  - Coordination:  finger-nose-finger and heel-knee-shin intact without dysmetria;    LABS:                          13.1   5.5   )-----------( 276      ( 2018 19:47 )             36.7     01-10    126<L>  |  92<L>  |  11  ----------------------------<  130<H>  3.8   |  20<L>  |  0.74    Ca    8.4      10 Shorty 2018 03:33    TPro  7.8  /  Alb  4.4  /  TBili  0.5  /  DBili  x   /  AST  38  /  ALT  19  /  AlkPhos  67  -    PT/INR - ( 2018 19:47 )   PT: 12.3 sec;   INR: 1.11          PTT - ( 2018 19:47 )  PTT:26.7 sec  Urinalysis Basic - ( 2018 20:07 )    Color: Yellow / Appearance: Clear / S.010 / pH: x  Gluc: x / Ketone: Trace mg/dL  / Bili: Negative / Urobili: 0.2 E.U./dL   Blood: x / Protein: NEGATIVE mg/dL / Nitrite: NEGATIVE   Leuk Esterase: NEGATIVE / RBC: x / WBC x   Sq Epi: x / Non Sq Epi: x / Bacteria: x        RADIOLOGY & ADDITIONAL STUDIES:
NEUROLOGY PROGRESS NOTE    INTERVAL HISTORY:  none     REVIEW OF SYSTEMS:  As per HPI, otherwise negative for Constitutional, Eyes, Ears/Nose/Mouth/Throat, Neck, Cardiovascular, Respiratory, Gastrointestinal, Genitourinary, Skin, Endocrine, Musculoskeletal, Psychiatric, and Hematologic/Lymphatic.    MEDICATIONS:  acetaminophen   Tablet. 650 milliGRAM(s) Oral every 6 hours PRN  aspirin  chewable 81 milliGRAM(s) Oral daily  atorvastatin 80 milliGRAM(s) Oral at bedtime  clopidogrel Tablet 75 milliGRAM(s) Oral daily  heparin  Injectable 5000 Unit(s) SubCutaneous every 8 hours  sodium chloride 0.9%. 1000 milliLiter(s) IV Continuous <Continuous>    VITAL SIGNS:  Vital Signs Last 24 Hrs  T(C): 36.3 (2018 05:25), Max: 37.2 (10 Shorty 2018 09:27)  T(F): 97.4 (2018 05:25), Max: 98.9 (10 Shorty 2018 09:27)  HR: 52 (2018 05:25) (52 - 78)  BP: 141/67 (2018 05:25) (114/65 - 143/63)  BP(mean): 89 (2018 02:30) (89 - 89)  RR: 17 (2018 05:25) (17 - 18)  SpO2: 94% (2018 05:25) (94% - 99%)    PHYSICAL EXAMINATION:  Constitutional: WDWN; NAD  Eyes: anicteric sclera  Cardiovascular: +S1/S2, RRR; no M/R/G  Neurologic:  - Mental Status:  AAOx3; speech is fluent with intact naming, repetition, and comprehension  - Cranial Nerves II-XII:    II:  PERRLA; visual fields are full to confrontation  III, IV, VI:  EOMI, no nystagmus  V:  facial sensation is intact in the V1-V3 distribution bilaterally.  VII:  face is symmetric with normal eye closure and smile  VIII:  hearing is intact to finger rub  IX, X:  uvula is midline and soft palate rises symmetrically  XI:  head turning and shoulder shrug are intact bilaterally  XII:  tongue protrudes in the midline  - Motor:  strength is 5/5 throughout; normal muscle bulk and tone throughout; no pronator drift  - Reflexes:  2+ and symmetric at the biceps, triceps, brachioradialis, knees, and ankles;  plantar reflexes downgoing bilaterally  - Sensory:  intact to light touch, pin prick, vibration, and joint-position sense throughout  - Coordination:  finger-nose-finger and heel-knee-shin intact without dysmetria; rapid alternating hand movements intact  - Gait:  normal steps, base, arm swing, and turning; heel and toe walking are normal; tandem gait is normal; Romberg testing is negative    LABS:                          13.1   5.5   )-----------( 276      ( 2018 19:47 )             36.7     01-10    126<L>  |  92<L>  |  11  ----------------------------<  130<H>  3.8   |  20<L>  |  0.74    Ca    8.4      10 Shorty 2018 03:33    TPro  7.8  /  Alb  4.4  /  TBili  0.5  /  DBili  x   /  AST  38  /  ALT  19  /  AlkPhos  67  01-09    PT/INR - ( 2018 19:47 )   PT: 12.3 sec;   INR: 1.11          PTT - ( 2018 19:47 )  PTT:26.7 sec  Urinalysis Basic - ( 2018 20:07 )    Color: Yellow / Appearance: Clear / S.010 / pH: x  Gluc: x / Ketone: Trace mg/dL  / Bili: Negative / Urobili: 0.2 E.U./dL   Blood: x / Protein: NEGATIVE mg/dL / Nitrite: NEGATIVE   Leuk Esterase: NEGATIVE / RBC: x / WBC x   Sq Epi: x / Non Sq Epi: x / Bacteria: x        RADIOLOGY & ADDITIONAL STUDIES:

## 2018-01-11 NOTE — DISCHARGE NOTE ADULT - CARE PLAN
Principal Discharge DX:	Migraine  Goal:	Please follow up with Neurology at [facility] or Dr. Coyne (Neurology at [facility])  Instructions for follow-up, activity and diet:	Your symptoms, which self-resolved and are associated with migraines are most likely a complex migraine syndrome.  Secondary Diagnosis:	Paresthesia  Instructions for follow-up, activity and diet:	You did not have evidence of stroke on repeated examinations (MRI, CT scan, ECHO)  Secondary Diagnosis:	Hyponatremia  Instructions for follow-up, activity and diet:	You came in with low sodium which is improving with fluid intake. Please follow up with your Primary Care Doctor regarding this (sodium of 127-->130)  Secondary Diagnosis:	Flu-like symptoms  Instructions for follow-up, activity and diet:	You have a cold virus which may explain some of your symptoms.

## 2018-01-11 NOTE — PROGRESS NOTE ADULT - ASSESSMENT
76yo F poor historian with a PMHx of migraine headaches for many years, asthma, who arrived with complaints of migraine headache, left sided numbness and weakness at noon on the day of admission, presenting as a stroke code outside of the window for tPA, symptoms concerning for infarct vs TIA vs complex migraine.
78yo F poor historian with a PMHx of migraine headaches for many years, asthma, who arrived with complaints of migraine headache, left sided numbness and weakness at noon on the day of admission, presenting as a stroke code outside of the window for tPA, symptoms concerning for infarct vs TIA vs complex migraine.

## 2018-01-11 NOTE — DISCHARGE NOTE ADULT - CARE PROVIDER_API CALL
Jessica Coyne), Neurology; Vascular Neurology  [address], [place] [address]  Phone: [phone]  Fax: [phone]

## 2018-01-11 NOTE — PROGRESS NOTE ADULT - PROBLEM SELECTOR PLAN 3
Patient with Hyponatremia to 127 on admission, serum Osm pending. Likely hypovolemic in setting of recent diarrhea, decreased PO intake and nausea.   - f/u Serum Osm, Urine Osm, Urine electrolytes  - Na of 127 reflects sodium deficit of 338mEq of Na  - c/w NS at 80cc/hr given hypovolemia
Patient with long history of severe migraine headaches with aura, usually visual scotoma with "burning" feeling of the brain. Patient with these prior to episode, though states all symptoms from aura to headache and associated L sided weakness/numbness are worse than usual  - Continue to monitor symptoms  - Tylenol PRN

## 2018-01-11 NOTE — DISCHARGE NOTE ADULT - HOSPITAL COURSE
77YF with a PMHx of migraine headaches for many years, asthma, who arrived with complaints of migraine headache, left sided numbness and weakness. Patient states she usually gets severe migraine headaches, but not quite this severe and with less pronounced symptoms of weakness or numbness. She said the headache started at work () at 12pm and right after she noticed left arm numbness and weakness. She began to feel nauseated and dizzy. She went home to lay down. She had 6 episodes of diarrhea, initially soft then progressed to watery. Daughter went to patient's house to see her and she noticed the patient's speech was slower and slurred. She also noticed that the patient was unsteady on her feet while walking to the bathroom. Patient's  is a physician and advised them to come to the hospital. Patient arrived to the ED as a stroke code at 6:48pm. Per family, patient's blood pressures are usually low. Patient denies any other recent changes to her health, denies weight gain or weight loss, fevers, chills, cough, SOB, CP, palpitations, abdominal pain, dysuria, lower extremity edema, decreased exercise tolerance. VSS with RVP + for coranovirus. All symptoms resolved, CT, MRI unremarkable for stroke more c/w complex migraine headache. ECHO negative with EF of 65%. 77YF with a PMHx of migraine headaches for many years, asthma, who arrived with complaints of migraine headache, left sided numbness and weakness. Patient states she usually gets severe migraine headaches, but not quite this severe and with less pronounced symptoms of weakness or numbness. She said the headache started at work () at 12pm and right after she noticed left arm numbness and weakness. She began to feel nauseated and dizzy. She went home to lay down. She had 6 episodes of diarrhea, initially soft then progressed to watery. Daughter went to patient's house to see her and she noticed the patient's speech was slower and slurred. She also noticed that the patient was unsteady on her feet while walking to the bathroom. Patient's  is a physician and advised them to come to the hospital. Patient arrived to the ED as a stroke code at 6:48pm. Per family, patient's blood pressures are usually low. Patient denies any other recent changes to her health, denies weight gain or weight loss, fevers, chills, cough, SOB, CP, palpitations, abdominal pain, dysuria, lower extremity edema, decreased exercise tolerance. VSS with RVP + for coranovirus. All symptoms resolved, CT, MRI unremarkable for stroke more c/w complex migraine headache. ECHO negative with EF of 65%.     Patient to follow up with her outside Neurologist, Dr. Eaton at [facility], for discussion regarding prophylactic medications to decrease the frequency and intensity of her migraines.  Discussed stroke signs and symptoms.  She should seek further medical care if she experiences acute weakness, numbness, speech or visual deficits.  Hand numbness is probably related to her migraines.

## 2018-01-11 NOTE — PROGRESS NOTE ADULT - PROBLEM SELECTOR PLAN 2
Patient with long history of severe migraine headaches with aura, usually visual scotoma with "burning" feeling of the brain. Patient with these prior to episode, though states all symptoms from aura to headache and associated L sided weakness/numbness are worse than usual  - Continue to monitor symptoms  - Tylenol PRN
Coranovirus +. Symptomatic treatment.

## 2018-01-11 NOTE — DISCHARGE NOTE ADULT - PATIENT PORTAL LINK FT
“You can access the [facility] Patient Portal, offered by [facility], by registering with the following website: [email or web address]”

## 2018-01-11 NOTE — PROGRESS NOTE ADULT - PROBLEM SELECTOR PLAN 5
Bedside dysphagia screening  NS at 80cc/hr  DASH/TLC diet once passed dysphagia screening
- Willian PRN

## 2018-01-11 NOTE — PROGRESS NOTE ADULT - PROBLEM SELECTOR PLAN 1
Patient presenting after roughly 7 hours of L sided numbness and weakness, CT negative for acute infarct or hemorrhage and CTA without any significant stenosis. Patient outside the window for tPA administration. NIHSS of 6 on admission.  - MRI brain   - c/w Aspirin 81mg, Plavix 75mg, Atorvastatin 80mg  - PT/OT consult  - EKG, Echo  - Permissive HTN to 220/110
Patient presenting after roughly 7 hours of L sided numbness and weakness, CT negative for acute infarct or hemorrhage and CTA without any significant stenosis. Patient outside the window for tPA administration. NIHSS of 6 on admission.  - MRI brain COMPLETED  - c/w Aspirin 81mg, Plavix 75mg, Atorvastatin 80mg  - PT/OT consult COMPLETED  - EKG, Echo COMPLETED  - Permissive HTN to 220/110

## 2018-01-11 NOTE — DISCHARGE NOTE ADULT - MEDICATION SUMMARY - MEDICATIONS TO TAKE
I will START or STAY ON the medications listed below when I get home from the hospital:    aspirin 81 mg oral tablet, chewable  -- 1 tab(s) by mouth once a day  -- Indication: For Paresthesia    atorvastatin 80 mg oral tablet  -- 1 tab(s) by mouth once a day (at bedtime)  -- Indication: For Paresthesia    clopidogrel 75 mg oral tablet  -- 1 tab(s) by mouth once a day  -- Indication: For Paresthesia

## 2018-01-13 DIAGNOSIS — G43.909 MIGRAINE, UNSPECIFIED, NOT INTRACTABLE, WITHOUT STATUS MIGRAINOSUS: ICD-10-CM

## 2018-01-13 DIAGNOSIS — J45.909 UNSPECIFIED ASTHMA, UNCOMPLICATED: ICD-10-CM

## 2018-01-13 DIAGNOSIS — J00 ACUTE NASOPHARYNGITIS [COMMON COLD]: ICD-10-CM

## 2018-01-13 DIAGNOSIS — R20.2 PARESTHESIA OF SKIN: ICD-10-CM

## 2018-01-13 DIAGNOSIS — R51 HEADACHE: ICD-10-CM

## 2018-01-13 DIAGNOSIS — E87.1 HYPO-OSMOLALITY AND HYPONATREMIA: ICD-10-CM

## 2018-01-14 LAB
CULTURE RESULTS: SIGNIFICANT CHANGE UP
CULTURE RESULTS: SIGNIFICANT CHANGE UP
SPECIMEN SOURCE: SIGNIFICANT CHANGE UP
SPECIMEN SOURCE: SIGNIFICANT CHANGE UP

## 2024-01-08 NOTE — STROKE CODE NOTE - NIH STROKE SCALE: 6A. MOTOR LEG, LEFT, QM
Refill Request    Medication request: gabapentin 300 MG Oral Cap     Take 1 capsule (300 mg total) by mouth in the morning, at noon, and at bedtime.     LOV:10/13/2023 Ranjith Armendariz,    Due back to clinic per last office note:  for injection  NOV: Visit date not found      [place]: 11/28/23 #90    Urine drug screen (if applicable): n/a  Pain contract: n/a    LOV plan (if weaning or changing medications): \" I advised him to continue gabapentin 300 mg 3 times daily. \"      Order for injection was placed at patient's Telemed encounter with Dr. Armendariz but our office was not notified of approval from central referral team. Order for L5-S1 Interlaminar Epidural Steroid Injection under fluoroscopy guidance under IV conscious sedation re-ordered since patient now has [employer] PPO.        
(1) Drift

## 2024-06-26 NOTE — ED ADULT NURSE NOTE - OBJECTIVE STATEMENT
Statement Selected Pt BIBA to ED A&O3 c/o slurred speech starting at about 630pm and migraine and left arm numbness starting around 12 pm today. per family, pt had slowed speech earlier which is normal with her migraines. pt also reports diarrhea today and nausea. denies cp/sob, fever/chills, vomiting. left sided facial droop noted. airway patent. respirations even, unlabored. pt takes aspirin 325 mg PO daily. took Excedrin x 2 today and valium x1, unsure of dose. Pt BIBA to ED A&O3 c/o slurred speech starting at about 630pm and migraine and left arm numbness starting around 12 pm today. per family, pt had slowed speech earlier which is normal with her migraines. pt also reports diarrhea today and nausea. denies cp/sob, fever/chills, vomiting. left sided facial droop noted. airway patent. respirations even, unlabored. pt takes aspirin 325 mg PO daily. took Excedrin x 2 today and valium x1, unsure of dose. pt noted to be trembling. rectal temp = 98.6